# Patient Record
Sex: FEMALE | Race: WHITE | Employment: UNEMPLOYED | URBAN - METROPOLITAN AREA
[De-identification: names, ages, dates, MRNs, and addresses within clinical notes are randomized per-mention and may not be internally consistent; named-entity substitution may affect disease eponyms.]

---

## 2023-02-14 ENCOUNTER — HOSPITAL ENCOUNTER (EMERGENCY)
Age: 48
Discharge: HOME | End: 2023-02-14
Attending: EMERGENCY MEDICINE
Payer: COMMERCIAL

## 2023-02-14 VITALS
HEART RATE: 100 BPM | TEMPERATURE: 98 F | BODY MASS INDEX: 35.36 KG/M2 | SYSTOLIC BLOOD PRESSURE: 155 MMHG | DIASTOLIC BLOOD PRESSURE: 86 MMHG | HEIGHT: 66 IN | RESPIRATION RATE: 18 BRPM | OXYGEN SATURATION: 100 % | WEIGHT: 220 LBS

## 2023-02-14 DIAGNOSIS — M54.42 ACUTE LEFT-SIDED LOW BACK PAIN WITH LEFT-SIDED SCIATICA: Primary | ICD-10-CM

## 2023-02-14 PROCEDURE — 99283 EMERGENCY DEPT VISIT LOW MDM: CPT

## 2023-02-14 PROCEDURE — 6370000000 HC RX 637 (ALT 250 FOR IP)

## 2023-02-14 RX ORDER — PREDNISONE 20 MG/1
40 TABLET ORAL DAILY
Qty: 8 TABLET | Refills: 0 | Status: SHIPPED | OUTPATIENT
Start: 2023-02-14 | End: 2023-02-18

## 2023-02-14 RX ORDER — KETOROLAC TROMETHAMINE 30 MG/ML
30 INJECTION, SOLUTION INTRAMUSCULAR; INTRAVENOUS ONCE
Status: DISCONTINUED | OUTPATIENT
Start: 2023-02-14 | End: 2023-02-14 | Stop reason: HOSPADM

## 2023-02-14 RX ORDER — PREDNISONE 20 MG/1
40 TABLET ORAL ONCE
Status: COMPLETED | OUTPATIENT
Start: 2023-02-14 | End: 2023-02-14

## 2023-02-14 RX ORDER — HYDROCHLOROTHIAZIDE 12.5 MG/1
12.5 CAPSULE, GELATIN COATED ORAL DAILY
COMMUNITY

## 2023-02-14 RX ADMIN — PREDNISONE 40 MG: 20 TABLET ORAL at 08:42

## 2023-02-14 ASSESSMENT — PAIN DESCRIPTION - DESCRIPTORS: DESCRIPTORS: ACHING;DISCOMFORT;SHARP

## 2023-02-14 ASSESSMENT — PAIN - FUNCTIONAL ASSESSMENT: PAIN_FUNCTIONAL_ASSESSMENT: 0-10

## 2023-02-14 ASSESSMENT — PAIN SCALES - GENERAL: PAINLEVEL_OUTOF10: 8

## 2023-02-14 ASSESSMENT — PAIN DESCRIPTION - LOCATION: LOCATION: BACK

## 2023-02-14 ASSESSMENT — PAIN DESCRIPTION - ORIENTATION: ORIENTATION: LEFT

## 2023-02-14 NOTE — ED PROVIDER NOTES
Lou Barlow is a 50 y.o. female    HPI   Lou Barlow is a 50 y.o. female presenting to the ED for Back Pain (Patient bend over to pick something up off the floor; Got a sharp pain in the left side of her back; Pain is consistent but worsens when she turns. \"Feels different from normal back pain: )    History comes primarily from the patient. Presents for left lower back pain with radiation down her left leg which started last night as she was emptying the . The patient says she has been having back pain for 2 decades but has never had any pain like this before. The patient has taken some ibuprofen with some relief. She can walk without significant pain, however laying down in her bed was difficult and she did not sleep very much last night. Patient denies any fevers, numbness or tingling in her legs, saddle paresthesias, nausea or vomiting, urinary hesitancy or incontinence      Review of Systems   Full review of systems completed. Pertinent positives and negatives per the HPI, unless otherwise stated ROS is negative. Physical Exam  Vitals and nursing note reviewed. Constitutional:       General: She is not in acute distress. Appearance: Normal appearance. She is not ill-appearing. HENT:      Head: Normocephalic and atraumatic. Right Ear: External ear normal.      Left Ear: External ear normal.      Nose: Nose normal. No rhinorrhea. Mouth/Throat:      Mouth: Mucous membranes are moist.      Pharynx: Oropharynx is clear. Eyes:      Extraocular Movements: Extraocular movements intact. Conjunctiva/sclera: Conjunctivae normal.      Pupils: Pupils are equal, round, and reactive to light. Cardiovascular:      Rate and Rhythm: Normal rate and regular rhythm. Heart sounds: Normal heart sounds. No murmur heard. Pulmonary:      Effort: Pulmonary effort is normal. No respiratory distress. Breath sounds: Normal breath sounds. No wheezing.    Musculoskeletal: General: Tenderness (Left lumbar paraspinous tenderness, no midline tenderness) present. No swelling or deformity. Normal range of motion. Cervical back: Normal range of motion. Skin:     General: Skin is warm and dry. Coloration: Skin is not jaundiced or pale. Neurological:      General: No focal deficit present. Mental Status: She is alert and oriented to person, place, and time. Mental status is at baseline. Sensory: No sensory deficit. Motor: No weakness. Gait: Gait normal.   Psychiatric:         Mood and Affect: Mood normal.         Behavior: Behavior normal.      Positive straight leg test.    Medical Decision Making/Differential Diagnosis:    CC/HPI Summary, social determinants of health, records reviewed, DDX, testing done/not done, ED course, reassessment, disposition considerations/shared decision making with patient, consults, disposition:    Medical Decision Making      Patient has a positive straight leg test and appears to have acute left-sided sciatica with low back pain on the left side as well. There is no midline tenderness and imaging is not ordered. Patient was offered Toradol but refused saying she would take ibuprofen at home. Patient given 40 mg of prednisone here and will complete the 5-day course outpatient with a prescription. Discussed the findings with the patient, and will be discharged to follow-up with PCP. Past medical history/chonic conditions affecting care   has a past medical history of Hodgkin lymphoma ([facility] [address] (1995). Social History     Tobacco Use    Smoking status: Never   Vaping Use    Vaping Use: Never used   Substance Use Topics    Alcohol use: No    Drug use: No       As interpreted by me, the below displayed labs are abnormal. All other labs obtained during this visit were within normal range or not returned as of this dictation.   Labs Reviewed - No data to display    Interpretation per the Radiologist below, if available at the time of this note:  No orders to display     No results found. No results found. Emergency Department Course  Vitals:    Vitals:    02/14/23 0822   BP: (!) 155/86   Pulse: 100   Resp: 18   SpO2: 100%   Weight: 220 lb (99.8 kg)   Height: 5' 6\" (1.676 m)       Patient was given the following medications:  Medications   ketorolac (TORADOL) injection 30 mg (30 mg IntraVENous Not Given 2/14/23 0843)   predniSONE (DELTASONE) tablet 40 mg (40 mg Oral Given 2/14/23 0842)                 CONSULTS: (Who and What was discussed)  None        I am the Primary Clinician of Record. Final impression  1.  Acute left-sided low back pain with left-sided sciatica          Disposition/plan  DISPOSITION Decision To Discharge 02/14/2023 09:36:38 AM    PATIENT REFERRED TO:  PCP referral line      Physician referral line: [phone]    DISCHARGE MEDICATIONS:  New Prescriptions    PREDNISONE (DELTASONE) 20 MG TABLET    Take 2 tablets by mouth daily for 4 days       DISCONTINUED MEDICATIONS:  Discontinued Medications    No medications on file              (Please note that portions of this note were completed with a voice recognition program.  Efforts were made to edit the dictations but occasionally words are mis-transcribed.)         Sinai Bishop MD  Resident  02/14/23 0713

## 2023-08-28 ENCOUNTER — HOSPITAL ENCOUNTER (OUTPATIENT)
Dept: MAMMOGRAPHY | Age: 48
Discharge: HOME | End: 2023-08-30
Payer: COMMERCIAL

## 2023-08-28 VITALS — WEIGHT: 230 LBS | HEIGHT: 66 IN | BODY MASS INDEX: 36.96 KG/M2

## 2023-08-28 DIAGNOSIS — Z12.31 ENCOUNTER FOR SCREENING MAMMOGRAM FOR MALIGNANT NEOPLASM OF BREAST: ICD-10-CM

## 2023-08-28 PROCEDURE — 77063 BREAST TOMOSYNTHESIS BI: CPT

## 2023-08-29 ENCOUNTER — TELEPHONE (OUTPATIENT)
Dept: MAMMOGRAPHY | Age: 48
End: 2023-08-29

## 2023-08-29 NOTE — TELEPHONE ENCOUNTER
Order request faxed to Kip Ojeda NP bilateral limited ultrasound as recommended from mammogram performed at [address] [place] [address] on 8/28/23. Successful fax confirmation.  KENNEDY Trimble, RN -Breast Navigator

## 2023-09-08 ENCOUNTER — TELEPHONE (OUTPATIENT)
Dept: MAMMOGRAPHY | Age: 48
End: 2023-09-08

## 2023-09-08 NOTE — TELEPHONE ENCOUNTER
Call to patient in reference to her mammogram performed at [facility] on 8/28/23. Instructed patient that the radiologist has recommended additional breast imaging in order to make a final determination and further recommendations. Patient verbalized understanding and is agreeable to proceed at [facility]-. Orders received per Ilana Clark NP and scanned into media.  Patient scheduled for 9/15/23 at 2:30 pm. KENNEDY Eaton, RN -Breast Navigator

## 2023-09-15 ENCOUNTER — HOSPITAL ENCOUNTER (OUTPATIENT)
Dept: ULTRASOUND IMAGING | Age: 48
Discharge: HOME | End: 2023-09-15
Payer: COMMERCIAL

## 2023-09-15 DIAGNOSIS — R92.8 ABNORMAL MAMMOGRAM: ICD-10-CM

## 2023-09-15 PROCEDURE — 76642 ULTRASOUND BREAST LIMITED: CPT

## 2023-09-19 ENCOUNTER — TELEPHONE (OUTPATIENT)
Dept: MAMMOGRAPHY | Age: 48
End: 2023-09-19

## 2023-09-19 NOTE — TELEPHONE ENCOUNTER
Order request faxed to Ilana Clark NP for bilateral breast ultrasound guided biopsy as recommended from ultrasound performed at [address] [place] [address] on 9/15/23. Successful fax confirmation.  KENNEDY Eaton, RN -Breast Navigator

## 2023-09-25 ENCOUNTER — TELEPHONE (OUTPATIENT)
Dept: MAMMOGRAPHY | Age: 48
End: 2023-09-25

## 2023-09-25 NOTE — TELEPHONE ENCOUNTER
Call to patient in reference to her breast ultrasounds performed at [facility] on 9/15/23. Instructed patient that the radiologist has recommended bilateral breast biopsy. Patient verbalized understanding and is agreeable to proceed at [facility]. Orders received per Selvin Moralez NP and scanned into media. Patient informed she will receive separate call from scheduling to arrange appointment for biopsy. Manjit Rodríguez in IR Scheduling notified of biopsy order, will call patient to set up biopsy appointment.  ESTHER JacksonN, RN -Breast Navigator

## 2023-10-11 ENCOUNTER — HOSPITAL ENCOUNTER (OUTPATIENT)
Dept: ULTRASOUND IMAGING | Age: 48
Discharge: HOME | End: 2023-10-11
Payer: COMMERCIAL

## 2023-10-11 ENCOUNTER — HOSPITAL ENCOUNTER (OUTPATIENT)
Dept: MAMMOGRAPHY | Age: 48
Discharge: HOME | End: 2023-10-13
Payer: COMMERCIAL

## 2023-10-11 DIAGNOSIS — Z98.890 STATUS POST BIOPSY: ICD-10-CM

## 2023-10-11 DIAGNOSIS — N64.89 BREAST ASYMMETRY: ICD-10-CM

## 2023-10-11 PROCEDURE — A4648 IMPLANTABLE TISSUE MARKER: HCPCS

## 2023-10-11 PROCEDURE — 77065 DX MAMMO INCL CAD UNI: CPT

## 2023-10-11 PROCEDURE — 88305 TISSUE EXAM BY PATHOLOGIST: CPT

## 2023-10-13 LAB — SURGICAL PATHOLOGY REPORT: NORMAL

## 2023-10-16 ENCOUNTER — TELEPHONE (OUTPATIENT)
Dept: MAMMOGRAPHY | Age: 48
End: 2023-10-16

## 2023-10-18 NOTE — TELEPHONE ENCOUNTER
10/16/23- Patient was asked prior to biopsy how she would like notified of her breast biopsy results and she elected telephone call from breast navigator. Call to patient today to instruct her that the pathology report from her breast biopsy indicates intraductal papilloma. Notified patient these findings could be discussed further with breast surgeon if she wishes, patient agreeable for referral to [facility]. Instructed that the performing radiologist will review her breast images and the pathology results for concordance and once completed will send results to physician's office. Verbalizes understanding. 10/18/23- Faxed addendum and pathology report to Kecia Shaffer NP, with patient request for referral to [facility]. Successful fax confirmation.  KENNEDY Shoemaker, RN - Breast Navigator

## 2024-01-18 ENCOUNTER — TELEPHONE (OUTPATIENT)
Dept: BREAST CENTER | Age: 49
End: 2024-01-18

## 2024-01-18 NOTE — TELEPHONE ENCOUNTER
Patient did not show for her consultation in the breast clinic today. Unable to leave message at either number. No voicemail set up. Will set a reminder to attempt again soon.

## 2024-02-05 ENCOUNTER — TELEPHONE (OUTPATIENT)
Dept: BREAST CENTER | Age: 49
End: 2024-02-05

## 2024-02-05 NOTE — TELEPHONE ENCOUNTER
Another attempt made to call patient and reschedule her consultation in the breast clinic. No voicemail set up. Will notify referring office of [facility] that patient did not show for her consultation and that we've attempted to reach her.

## 2024-02-05 NOTE — TELEPHONE ENCOUNTER
----- Message from Matilda Hurtado RN sent at 1/18/2024  9:25 AM EST -----  See if patient returned call first.    Patient did not show for her consultation in the breast clinic today. Unable to leave message at either number. No voicemail set up. Will set a reminder to attempt again soon.    NEW PATIENT Right breast intraductal papilloma by biopsy at [facility], Left Breast benign: surgical consult recommended. Ref: Georgina Pedraza - Imaging & bx. [facility]